# Patient Record
Sex: MALE | Race: BLACK OR AFRICAN AMERICAN | Employment: FULL TIME | URBAN - METROPOLITAN AREA
[De-identification: names, ages, dates, MRNs, and addresses within clinical notes are randomized per-mention and may not be internally consistent; named-entity substitution may affect disease eponyms.]

---

## 2021-07-23 ENCOUNTER — VIRTUAL VISIT (OUTPATIENT)
Dept: FAMILY MEDICINE CLINIC | Age: 46
End: 2021-07-23
Payer: COMMERCIAL

## 2021-07-23 DIAGNOSIS — F32.A DEPRESSION, UNSPECIFIED DEPRESSION TYPE: ICD-10-CM

## 2021-07-23 DIAGNOSIS — F41.9 ANXIETY: Primary | ICD-10-CM

## 2021-07-23 DIAGNOSIS — R63.5 WEIGHT GAIN: ICD-10-CM

## 2021-07-23 DIAGNOSIS — R53.83 FATIGUE, UNSPECIFIED TYPE: ICD-10-CM

## 2021-07-23 PROCEDURE — 99203 OFFICE O/P NEW LOW 30 MIN: CPT | Performed by: FAMILY MEDICINE

## 2021-07-23 RX ORDER — ESCITALOPRAM OXALATE 10 MG/1
TABLET ORAL
COMMUNITY
Start: 2021-07-06

## 2021-07-23 RX ORDER — LISINOPRIL AND HYDROCHLOROTHIAZIDE 12.5; 2 MG/1; MG/1
TABLET ORAL
COMMUNITY
Start: 2021-07-09 | End: 2022-02-23 | Stop reason: SDUPTHER

## 2021-07-23 RX ORDER — AMLODIPINE BESYLATE 5 MG/1
TABLET ORAL
COMMUNITY
Start: 2021-07-15 | End: 2022-02-23 | Stop reason: SDUPTHER

## 2021-07-23 RX ORDER — QUETIAPINE FUMARATE 200 MG/1
TABLET, FILM COATED ORAL
COMMUNITY
Start: 2021-07-16

## 2021-07-23 NOTE — PATIENT INSTRUCTIONS
[facility] Board:  24 hour crisis line: [phone]  Daytime number: [phone]  Psychiatry, counseling, case management, drug/alcohol addiction    Abrahan Mckeon [address] (Dr. Sunil Kate): [employer]. com/  [address] [facility]. [address], [address]  Phone: [phone] ([phone])  Fax: [phone]  Office Hours:  Mon: 10:00 am to 4:00 pm  Tue: 8:00 am to 6:00 pm  Wed-Thur: 8:00 am to 7:00 pm    [facility]: [employer]. com/  Joseph ([phone]),  Jun ([phone])  Ruth ([phone])  [address] ([phone])    [address] [facility]  Addiction/Substance abuse   [place]: [phone]  [place]: [facility]: [employer].[address] , [address] [place] [address] [address]  Ph. [phone] Fax [phone]    Zee Kasper: [email or web address]  [employer] ([phone])  Shahram Warren ([phone])

## 2021-07-23 NOTE — PROGRESS NOTES
Eloy Candelario  39 y.o. male  1975  [address] [facility] [address]  [id number]   Queenie Celeste MD       Encounter Date and Time: July 23, 2021 at 2:31 PM    Consent: Eloy Candelario, who was seen by synchronous (real-time) audio-video technology, and/or his healthcare decision maker, is aware that this patient-initiated, Telehealth encounter on 7/23/2021 is a billable service, with coverage as determined by his insurance carrier. He is aware that he may receive a bill and has provided verbal consent to proceed: Yes. Chief Complaint   Patient presents with    Depression    Anxiety     History of Present Illness   Eloy Candelario is a 39 y.o. male was evaluated by synchronous (real-time) audio-video technology from home, through a secure patient portal.    Patient with a history of depression and anxiety for 20 years and has been on medications for 10 years. Patient interested in finding a psychiatrist.  No SI or HI. Also complaining of fatigue, loss of sex drive and weight gain. Review of Systems   ROS    Vitals/Objective:     General: alert, cooperative, no distress   Mental  status: mental status: alert, oriented to person, place, and time, normal mood, behavior, speech, dress, motor activity, and thought processes   Resp: resp: normal effort and no respiratory distress   Neuro: neuro: no gross deficits   Skin: skin: no discoloration or lesions of concern on visible areas   Due to this being a TeleHealth evaluation, many elements of the physical examination are unable to be assessed. Assessment and Plan:       1. Anxiety  - REFERRAL TO PSYCHIATRY    2. Depression, unspecified depression type  - REFERRAL TO PSYCHIATRY    3. Weight gain  - TSH 3RD GENERATION; Future  - METABOLIC PANEL, COMPREHENSIVE; Future    4. Fatigue, unspecified type  - TSH 3RD GENERATION; Future  - CBC WITH AUTOMATED DIFF; Future  - METABOLIC PANEL, COMPREHENSIVE;  Future    Patient interested in [employer] [facility].  States he has some labs ordered by 98.6 telemedicine and will bring those labs in when he comes in. Will order labs for concerns above, will need in person visit for annual wellness. Time spent in direct conversation with the patient to include medical condition(s) discussed, assessment and treatment plan:       We discussed the expected course, resolution and complications of the diagnosis(es) in detail. Medication risks, benefits, costs, interactions, and alternatives were discussed as indicated. I advised him to contact the office if his condition worsens, changes or fails to improve as anticipated. He expressed understanding with the diagnosis(es) and plan. Patient understands that this encounter was a temporary measure, and the importance of further follow up and examination was emphasized. Patient verbalized understanding. Patient informed to follow up: Follow-up and Dispositions  ·   Return in about 2 weeks (around 8/6/2021) for In person for annual wellness visit . Electronically Signed: Jin Samayoa MD    CPT Codes 50216-26890 for Established Patients may apply to this Telehealth Visit. POS code: 18. Modifier GT    Sergey Ariza is a 39 y.o. male who was evaluated by an audio-video encounter for concerns as above. Patient identification was verified prior to start of the visit. A caregiver was present when appropriate. Due to this being a TeleHealth encounter (During RZJ-84 public health emergency), evaluation of the following organ systems was limited: Vitals/Constitutional/EENT/Resp/CV/GI//MS/Neuro/Skin/Heme-Lymph-Imm.   Pursuant to the emergency declaration under the [facility], 1135 waiver authority and the [employer] and [employer] Act, this Virtual Visit was conducted, with patient's (and/or legal guardian's) consent, to reduce the patient's risk of exposure to COVID-19 and provide necessary medical care. Services were provided through a synchronous discussion virtually to substitute for in-person clinic visit. I was at home. The patient was at home. History   Patients past medical, surgical and family histories were reviewed and updated. No past medical history on file. No past surgical history on file. No family history on file. Social History     Tobacco Use    Smoking status: Not on file   Substance Use Topics    Alcohol use: Not on file    Drug use: Not on file     There is no problem list on file for this patient.          Current Medications/Allergies   Medications and Allergies reviewed:      No Known Allergies

## 2021-08-17 ENCOUNTER — OFFICE VISIT (OUTPATIENT)
Dept: FAMILY MEDICINE CLINIC | Age: 46
End: 2021-08-17
Payer: COMMERCIAL

## 2021-08-17 VITALS
WEIGHT: 315 LBS | BODY MASS INDEX: 45.1 KG/M2 | DIASTOLIC BLOOD PRESSURE: 83 MMHG | HEIGHT: 70 IN | HEART RATE: 87 BPM | OXYGEN SATURATION: 94 % | RESPIRATION RATE: 16 BRPM | TEMPERATURE: 98.5 F | SYSTOLIC BLOOD PRESSURE: 130 MMHG

## 2021-08-17 DIAGNOSIS — F32.A DEPRESSION, UNSPECIFIED DEPRESSION TYPE: ICD-10-CM

## 2021-08-17 DIAGNOSIS — R07.89 CHEST DISCOMFORT: ICD-10-CM

## 2021-08-17 DIAGNOSIS — F41.9 ANXIETY: ICD-10-CM

## 2021-08-17 DIAGNOSIS — Z00.00 WELLNESS EXAMINATION: Primary | ICD-10-CM

## 2021-08-17 DIAGNOSIS — I10 ESSENTIAL HYPERTENSION: ICD-10-CM

## 2021-08-17 DIAGNOSIS — G47.33 OBSTRUCTIVE SLEEP APNEA SYNDROME: ICD-10-CM

## 2021-08-17 PROBLEM — G47.30 SLEEP APNEA: Status: ACTIVE | Noted: 2021-08-17

## 2021-08-17 PROCEDURE — 99213 OFFICE O/P EST LOW 20 MIN: CPT | Performed by: STUDENT IN AN ORGANIZED HEALTH CARE EDUCATION/TRAINING PROGRAM

## 2021-08-17 PROCEDURE — 93000 ELECTROCARDIOGRAM COMPLETE: CPT | Performed by: STUDENT IN AN ORGANIZED HEALTH CARE EDUCATION/TRAINING PROGRAM

## 2021-08-17 NOTE — PROGRESS NOTES
[address] [facility] [address] [facility]   Office [phone], Fax [phone]    Subjective:     Chief Complaint   Patient presents with    Complete Physical     Patient is coming in for a complete physical and bloodwork. he states thatlast week he almost had a panick attack and has been feeling off. He felt sick and heart raising. Sometimes he felt like it was burps that was having trouble to come up. No other concerns. HPI:  Al Adjutant is a 42 y.o. male  with PMHx of HTN, anxiety, sleep apnea (on CPAP), insomnia, and \"leaky heart valve\" that presents for a wellness examination. HTN: Well controlled. Patient takes his medications daily as prescribed. Anxiety/Depression: On lexapro and seroquel for 10 years now. Patient states this has helped a lot. However, he reports a break through panic attack last week after a bad day at work where he thought he was going to lose his job. He reports that his heart started racing and he couldn't stop the racing thoughts. He eventually calmed down but has felt chest heaviness since the event. He says it has started to get better over time. Patient is scheduled to see a new psychiatrist in the coming weeks. He denies SI/HI. Sleep apnea on CPAP and insomnia: Patient uses CPAP nightly. Reports he still has difficulty sleeping despite seroquel use. Reports he has been taking his fiance's trazadone during the interim when he couldn't get his seroquel refilled. He wants to come off the seroquel as he feels it makes him too groggy. \"Leaky valve\": Patient reports that he was diagnosed with many years ago, doesn't recall what exactly the diagnosis was. Does recall that if he goes to dentist he has to go on antibiotics prior. Has had stress test many years ago (doesn't remember why) and recalls it was unremarkable. Diet/Exercise: Trying to increase vegetables, have a hard time cutting back on carbs. Wants to start exercising by walking daily. Fiance is encouraging of these lifestyle changes. Social hx: Works at a group home. Drinks 1 drink every 2 weeks, no smoking, no illicit drug use. Health Maintenance:  Health Maintenance Due   Topic Date Due    Hepatitis C Screening  Never done    COVID-19 Vaccine (1) Never done    DTaP/Tdap/Td series (1 - Tdap) Never done    Lipid Screen  Never done    Patient is COVID vaccinated and will bring card in at next appointment. Past Medical Hx  I personally reviewed (see HPI above)      SocHx   I personally reviewed (see HPI above)       Allergies  I personally reviewed. Allergies   Allergen Reactions    Seafood Other (comments)     vomitting    Other Plant, Animal, Environmental Hives     Any color dye cause him a rash and hives        Medications  I personally reviewed. Current Outpatient Medications on File Prior to Visit   Medication Sig Dispense Refill    amLODIPine (NORVASC) 5 mg tablet TAKE 1 TABLET BY MOUTH EVERY DAY      escitalopram oxalate (LEXAPRO) 10 mg tablet TAKE 1 TABLET BY MOUTH EVERY DAY AS DIRECTED FOR 90 DAYS      lisinopril-hydroCHLOROthiazide (PRINZIDE, ZESTORETIC) 20-12.5 mg per tablet TAKE 1 TABLET BY MOUTH EVERY DAY      QUEtiapine (SEROquel) 200 mg tablet        No current facility-administered medications on file prior to visit. ROS:     Review of Systems   Constitutional:        Weight gain   Cardiovascular:        Chest tightening, heavy feeling on chest   Gastrointestinal: Negative for abdominal pain and heartburn. Psychiatric/Behavioral: Positive for depression. Negative for substance abuse and suicidal ideas. The patient is nervous/anxious. Objective:   Vitals  I personally reviewed.   Visit Vitals  /83 (BP 1 Location: Right upper arm, BP Patient Position: Sitting)   Pulse 87   Temp 98.5 °F (36.9 °C) (Oral)   Resp 16   Ht 5' 10.28\" (1.785 m)   Wt (!) 362 lb (164.2 kg)   SpO2 94%   BMI 51.53 kg/m²        Physical Exam  Physical Exam   General AO x 3. No distress. Not diaphoretic. No jaundice. No cyanosis. No pallor. HENT Head Normocephalic and atraumatic. Eyes No scleral icterus. EOMI. Neck No thyromegaly present. No cervical adenopathy. Cardio Normal rate, regular rhythm. No murmur, gallop, or friction rub. No chest wall tenderness. Pulmonary Effort normal. Breath sounds normal. No respiratory distress. No wheezes, rales, or rhonchi. No Stridor. Abdominal Soft. Bowel sounds normal. No distension. No tenderness. Extremities No edema of lower extremities. No tenderness. Neurological No focal deficits. Skin Skin is warm and dry. No rash noted. No erythema. Psychiatric Mood, affect, and judgment normal.        I personally reviewed the following Pertinent Labs/Studies:   - None    Assessment:       ICD-10-CM ICD-9-CM    1. Wellness examination  Z00.00 V70.0 LIPID PANEL   2. Chest discomfort  R07.89 786.59 AMB POC EKG ROUTINE W/ 12 LEADS, INTER & REP   3. Anxiety  F41.9 300.00    4. Depression, unspecified depression type  F32.9 311    5. Essential hypertension  I10 401.9    6. Obstructive sleep apnea syndrome  G47.33 327.23        Plan:     Wellness examination  Patient will come back in for lab appointment. Will call with results. - LIPID PANEL; Future  - TSH  - CMP  - CBC    Chest discomfort   EKG consistent with NSR, RRR, no axis deviation, no ST segment changes. Likely secondary to panic attack with lingering anxiety. Anxiety & Depression, unspecified depression type  Patient has appointment with psychiatrist pending. Waiting to make changes to medications until sees psychiatrist.     Essential hypertension  Continue medications as previously prescribed. Stable at this time. Obstructive sleep apnea syndrome  Continue CPAP at night. Encouraged weight loss to help improve sleep apnea. Obesity  Spent significant time discussing importance of balanced diet and exercise.  Patient is trying to cut back on fast foods, increase vegetable intake and watch carbs. I encouraged him to focus on small changes daily. Encouraged daily walks for as long as he can to start for exercise. - Follow up on progress at next appt          Pt was discussed with Dr. Nasir Tadeo (attending physician). I have reviewed patient medical and social history and medications. I have reviewed pertinent labs results and other data. I have discussed the diagnosis with the patient and the intended plan as seen in the above orders. The patient has received an after-visit summary and questions were answered concerning future plans. I have discussed medication side effects and warnings with the patient as well.     Luci Judd MD  [facility]  08/17/21

## 2021-08-17 NOTE — PROGRESS NOTES
Nora Alvarado is a 42 y.o. male    Chief Complaint   Patient presents with    Complete Physical     Patient is coming in for a complete physical and bloodwork. he states thatlast week he almost had a panick attack and has been feeling off. He felt sick and heart raising. Sometimes he felt like it was burps that was having trouble to come up. No other concerns. 1. Have you been to the ER, urgent care clinic since your last visit? Hospitalized since your last visit? No    2. Have you seen or consulted any other health care providers outside of the [address] [place] [address] since your last visit? Include any pap smears or colon screening. No      Visit Vitals  /83 (BP 1 Location: Right upper arm, BP Patient Position: Sitting)   Pulse 87   Temp 98.5 °F (36.9 °C) (Oral)   Resp 16   Ht 5' 10.28\" (1.785 m)   Wt (!) 362 lb (164.2 kg)   SpO2 94%   BMI 51.53 kg/m²           Health Maintenance Due   Topic Date Due    Hepatitis C Screening  Never done    COVID-19 Vaccine (1) Never done    DTaP/Tdap/Td series (1 - Tdap) Never done    Lipid Screen  Never done         Medication Reconciliation completed, changes noted.   Please  Update medication list.

## 2021-08-17 NOTE — PATIENT INSTRUCTIONS
Thank you for coming in! As discussed, please follow up with psychiatrist appointment you made. We will follow up on the labs and let you know if we need to make any changes.

## 2021-08-25 ENCOUNTER — LAB ONLY (OUTPATIENT)
Dept: FAMILY MEDICINE CLINIC | Age: 46
End: 2021-08-25

## 2021-08-25 DIAGNOSIS — R53.83 FATIGUE, UNSPECIFIED TYPE: ICD-10-CM

## 2021-08-25 DIAGNOSIS — Z00.00 WELLNESS EXAMINATION: ICD-10-CM

## 2021-08-25 DIAGNOSIS — R63.5 WEIGHT GAIN: ICD-10-CM

## 2021-08-25 LAB
ALBUMIN SERPL-MCNC: 3.9 G/DL (ref 3.5–5)
ALBUMIN/GLOB SERPL: 1 {RATIO} (ref 1.1–2.2)
ALP SERPL-CCNC: 81 U/L (ref 45–117)
ALT SERPL-CCNC: 28 U/L (ref 12–78)
ANION GAP SERPL CALC-SCNC: 4 MMOL/L (ref 5–15)
AST SERPL-CCNC: 21 U/L (ref 15–37)
BILIRUB SERPL-MCNC: 0.6 MG/DL (ref 0.2–1)
BUN SERPL-MCNC: 17 MG/DL (ref 6–20)
BUN/CREAT SERPL: 13 (ref 12–20)
CALCIUM SERPL-MCNC: 8.5 MG/DL (ref 8.5–10.1)
CHLORIDE SERPL-SCNC: 107 MMOL/L (ref 97–108)
CHOLEST SERPL-MCNC: 172 MG/DL
CO2 SERPL-SCNC: 29 MMOL/L (ref 21–32)
CREAT SERPL-MCNC: 1.34 MG/DL (ref 0.7–1.3)
GLOBULIN SER CALC-MCNC: 3.8 G/DL (ref 2–4)
GLUCOSE SERPL-MCNC: 91 MG/DL (ref 65–100)
HDLC SERPL-MCNC: 46 MG/DL
HDLC SERPL: 3.7 {RATIO} (ref 0–5)
LDLC SERPL CALC-MCNC: 103.6 MG/DL (ref 0–100)
POTASSIUM SERPL-SCNC: 4.4 MMOL/L (ref 3.5–5.1)
PROT SERPL-MCNC: 7.7 G/DL (ref 6.4–8.2)
SODIUM SERPL-SCNC: 140 MMOL/L (ref 136–145)
TRIGL SERPL-MCNC: 112 MG/DL (ref ?–150)
TSH SERPL DL<=0.05 MIU/L-ACNC: 0.99 UIU/ML (ref 0.36–3.74)
VLDLC SERPL CALC-MCNC: 22.4 MG/DL

## 2021-08-27 ENCOUNTER — TELEPHONE (OUTPATIENT)
Dept: FAMILY MEDICINE CLINIC | Age: 46
End: 2021-08-27

## 2021-08-27 DIAGNOSIS — R79.89 ELEVATED SERUM CREATININE: Primary | ICD-10-CM

## 2021-08-30 NOTE — PROGRESS NOTES
I don't think we usually consider statin before risk is over 7.5%. See me next time we're both in clinic.   [facility]

## 2021-09-10 ENCOUNTER — TELEPHONE (OUTPATIENT)
Dept: FAMILY MEDICINE CLINIC | Age: 46
End: 2021-09-10

## 2021-09-10 NOTE — TELEPHONE ENCOUNTER
Called patient and verified identity x2. Informed him to watch fat intake. Cr slightly elevated at 1.34. Told him likely due to dehydration, we can repeat in 6 months when he follows up for HTN.

## 2021-11-11 ENCOUNTER — VIRTUAL VISIT (OUTPATIENT)
Dept: FAMILY MEDICINE CLINIC | Age: 46
End: 2021-11-11
Payer: COMMERCIAL

## 2021-11-11 DIAGNOSIS — R63.4 WEIGHT LOSS: ICD-10-CM

## 2021-11-11 DIAGNOSIS — R53.83 FATIGUE, UNSPECIFIED TYPE: ICD-10-CM

## 2021-11-11 DIAGNOSIS — K21.9 GASTROESOPHAGEAL REFLUX DISEASE, UNSPECIFIED WHETHER ESOPHAGITIS PRESENT: ICD-10-CM

## 2021-11-11 DIAGNOSIS — R11.0 NAUSEA: ICD-10-CM

## 2021-11-11 DIAGNOSIS — R11.10 VOMITING, INTRACTABILITY OF VOMITING NOT SPECIFIED, PRESENCE OF NAUSEA NOT SPECIFIED, UNSPECIFIED VOMITING TYPE: Primary | ICD-10-CM

## 2021-11-11 PROCEDURE — 99214 OFFICE O/P EST MOD 30 MIN: CPT | Performed by: FAMILY MEDICINE

## 2021-11-11 RX ORDER — PHENOL/SODIUM PHENOLATE
20 AEROSOL, SPRAY (ML) MUCOUS MEMBRANE DAILY
Qty: 42 TABLET | Refills: 0 | Status: SHIPPED | OUTPATIENT
Start: 2021-11-11 | End: 2021-12-23

## 2021-11-11 NOTE — PROGRESS NOTES
Cristo Wilburn  42 y.o. male  1975  [address] [place] [address]  [id number]   Citlali Alvarado MD       Encounter Date and Time: November 11, 2021 at 2:59 PM    Consent: Cristo Wilburn, who was seen by synchronous (real-time) audio-video technology, and/or his healthcare decision maker, is aware that this patient-initiated, Telehealth encounter on 11/11/2021 is a billable service, with coverage as determined by his insurance carrier. He is aware that he may receive a bill and has provided verbal consent to proceed: Yes. Chief Complaint   Patient presents with    Vomiting    Fatigue     History of Present Illness   Cristo Wilburn is a 42 y.o. male was evaluated by synchronous (real-time) audio-video technology from home, through a secure patient portal.    For the past 3 weeks, has had decreased appetite, nausea and vomitting many times after meals but not every meal, usually a few times a week. Feels like food is sitting in throat for a while. Also feeling more fatigued. Also lost some weight since then. Better with water. No abdominal pain and having normal bowel movements. Denies heartburn and denies cough. Not worse with any certain foods. Denies recent illness but has some sinus headaches which is his baseline for this time of year. States he went to [facility] for this and did some blood work. Not sure what what was tested other than workers comp testing for HIV and Hepatitis. States he is very unhappy at his job and works in a [employer] at [employer]. Review of Systems   Review of Systems   Constitutional: Negative for fever. Respiratory: Negative for cough, shortness of breath and wheezing. Cardiovascular: Negative for chest pain, palpitations, orthopnea and leg swelling.      Vitals/Objective:     General: alert, cooperative, no distress   Mental  status: mental status: alert, oriented to person, place, and time, normal mood, behavior, speech, dress, motor activity, and thought processes   Resp: resp: normal effort and no respiratory distress   Neuro: neuro: no gross deficits   Skin: skin: no discoloration or lesions of concern on visible areas   Due to this being a TeleHealth evaluation, many elements of the physical examination are unable to be assessed. Assessment and Plan:       1. Vomiting, intractability of vomiting not specified, presence of nausea not specified, unspecified vomiting type  - METABOLIC PANEL, COMPREHENSIVE; Future  - CBC WITH AUTOMATED DIFF; Future  - TSH 3RD GENERATION; Future    2. Nausea  - METABOLIC PANEL, COMPREHENSIVE; Future  - CBC WITH AUTOMATED DIFF; Future  - TSH 3RD GENERATION; Future    3. Fatigue, unspecified type  - METABOLIC PANEL, COMPREHENSIVE; Future  - CBC WITH AUTOMATED DIFF; Future  - TSH 3RD GENERATION; Future    4. Weight loss  - METABOLIC PANEL, COMPREHENSIVE; Future  - CBC WITH AUTOMATED DIFF; Future  - TSH 3RD GENERATION; Future    5. Gastroesophageal reflux disease, unspecified whether esophagitis present  - Omeprazole delayed release (PRILOSEC D/R) 20 mg tablet; Take 1 Tablet by mouth daily for 42 days. Dispense: 42 Tablet; Refill: 0    Will come in and get labs. Can try PPI for 6 weeks. If not better or cannot tolerate being off PPI, may need endoscopy. Number to [facility] given. We discussed the expected course, resolution and complications of the diagnosis(es) in detail. Medication risks, benefits, costs, interactions, and alternatives were discussed as indicated. I advised him to contact the office if his condition worsens, changes or fails to improve as anticipated. He expressed understanding with the diagnosis(es) and plan. Patient understands that this encounter was a temporary measure, and the importance of further follow up and examination was emphasized. Patient verbalized understanding. Patient informed to follow up:    Follow-up and Dispositions  ·   Return in about 2 months (around 1/11/2022) for Follow up for acute issue and 1 year for annual wellness . Electronically Signed: Sandra Manzo MD    CPT Codes 09364-72912 for Established Patients may apply to this Telehealth Visit. POS code: 18. Modifier GT    Alireza Escudero is a 42 y.o. male who was evaluated by an audio-video encounter for concerns as above. Patient identification was verified prior to start of the visit. A caregiver was present when appropriate. Due to this being a TeleHealth encounter (During [facility]- public health emergency), evaluation of the following organ systems was limited: Vitals/Constitutional/EENT/Resp/CV/GI//MS/Neuro/Skin/Heme-Lymph-Imm. Pursuant to the emergency declaration under the [address] [place] [address], 1135 waiver authority and the [employer] Act, this Virtual Visit was conducted, with patient's (and/or legal guardian's) consent, to reduce the patient's risk of exposure to COVID-19 and provide necessary medical care. Services were provided through a synchronous discussion virtually to substitute for in-person clinic visit. I was at home. The patient was at home. History   Patients past medical, surgical and family histories were reviewed and updated. No past medical history on file. No past surgical history on file. No family history on file. Social History     Tobacco Use    Smoking status: Not on file    Smokeless tobacco: Not on file   Substance Use Topics    Alcohol use: Not on file    Drug use: Not on file     Patient Active Problem List   Diagnosis Code    Hypertension I10    Anxiety F41.9    Depression F32. A    Sleep apnea G47.30          Current Medications/Allergies   Medications and Allergies reviewed:    Current Outpatient Medications   Medication Sig Dispense Refill    Omeprazole delayed release (PRILOSEC D/R) 20 mg tablet Take 1 Tablet by mouth daily for 42 days.  43 Tablet 0    amLODIPine (NORVASC) 5 mg tablet TAKE 1 TABLET BY MOUTH EVERY DAY      escitalopram oxalate (LEXAPRO) 10 mg tablet TAKE 1 TABLET BY MOUTH EVERY DAY AS DIRECTED FOR 90 DAYS      lisinopril-hydroCHLOROthiazide (PRINZIDE, ZESTORETIC) 20-12.5 mg per tablet TAKE 1 TABLET BY MOUTH EVERY DAY      QUEtiapine (SEROquel) 200 mg tablet        Allergies   Allergen Reactions    Seafood Other (comments)     vomitting    Other Plant, Animal, Environmental Hives     Any color dye cause him a rash and hives

## 2021-12-13 ENCOUNTER — LAB ONLY (OUTPATIENT)
Dept: FAMILY MEDICINE CLINIC | Age: 46
End: 2021-12-13

## 2021-12-13 DIAGNOSIS — R11.10 VOMITING, INTRACTABILITY OF VOMITING NOT SPECIFIED, PRESENCE OF NAUSEA NOT SPECIFIED, UNSPECIFIED VOMITING TYPE: ICD-10-CM

## 2021-12-13 DIAGNOSIS — R53.83 FATIGUE, UNSPECIFIED TYPE: ICD-10-CM

## 2021-12-13 DIAGNOSIS — R11.0 NAUSEA: ICD-10-CM

## 2021-12-13 DIAGNOSIS — R63.4 WEIGHT LOSS: ICD-10-CM

## 2021-12-14 LAB
ALBUMIN SERPL-MCNC: 4.1 G/DL (ref 3.5–5)
ALBUMIN/GLOB SERPL: 1 {RATIO} (ref 1.1–2.2)
ALP SERPL-CCNC: 96 U/L (ref 45–117)
ALT SERPL-CCNC: 24 U/L (ref 12–78)
ANION GAP SERPL CALC-SCNC: 2 MMOL/L (ref 5–15)
AST SERPL-CCNC: 19 U/L (ref 15–37)
BASOPHILS # BLD: 0 K/UL (ref 0–0.1)
BASOPHILS NFR BLD: 0 % (ref 0–1)
BILIRUB SERPL-MCNC: 0.6 MG/DL (ref 0.2–1)
BUN SERPL-MCNC: 16 MG/DL (ref 6–20)
BUN/CREAT SERPL: 13 (ref 12–20)
CALCIUM SERPL-MCNC: 9.3 MG/DL (ref 8.5–10.1)
CHLORIDE SERPL-SCNC: 103 MMOL/L (ref 97–108)
CO2 SERPL-SCNC: 30 MMOL/L (ref 21–32)
CREAT SERPL-MCNC: 1.26 MG/DL (ref 0.7–1.3)
DIFFERENTIAL METHOD BLD: NORMAL
EOSINOPHIL # BLD: 0.2 K/UL (ref 0–0.4)
EOSINOPHIL NFR BLD: 4 % (ref 0–7)
ERYTHROCYTE [DISTWIDTH] IN BLOOD BY AUTOMATED COUNT: 13.9 % (ref 11.5–14.5)
GLOBULIN SER CALC-MCNC: 4.1 G/DL (ref 2–4)
GLUCOSE SERPL-MCNC: 103 MG/DL (ref 65–100)
HCT VFR BLD AUTO: 43.2 % (ref 36.6–50.3)
HGB BLD-MCNC: 14.2 G/DL (ref 12.1–17)
IMM GRANULOCYTES # BLD AUTO: 0 K/UL (ref 0–0.04)
IMM GRANULOCYTES NFR BLD AUTO: 0 % (ref 0–0.5)
LYMPHOCYTES # BLD: 1.9 K/UL (ref 0.8–3.5)
LYMPHOCYTES NFR BLD: 40 % (ref 12–49)
MCH RBC QN AUTO: 27.6 PG (ref 26–34)
MCHC RBC AUTO-ENTMCNC: 32.9 G/DL (ref 30–36.5)
MCV RBC AUTO: 83.9 FL (ref 80–99)
MONOCYTES # BLD: 0.5 K/UL (ref 0–1)
MONOCYTES NFR BLD: 10 % (ref 5–13)
NEUTS SEG # BLD: 2.2 K/UL (ref 1.8–8)
NEUTS SEG NFR BLD: 46 % (ref 32–75)
NRBC # BLD: 0 K/UL (ref 0–0.01)
NRBC BLD-RTO: 0 PER 100 WBC
PLATELET # BLD AUTO: 302 K/UL (ref 150–400)
PMV BLD AUTO: 10.9 FL (ref 8.9–12.9)
POTASSIUM SERPL-SCNC: 4.1 MMOL/L (ref 3.5–5.1)
PROT SERPL-MCNC: 8.2 G/DL (ref 6.4–8.2)
RBC # BLD AUTO: 5.15 M/UL (ref 4.1–5.7)
SODIUM SERPL-SCNC: 135 MMOL/L (ref 136–145)
TSH SERPL DL<=0.05 MIU/L-ACNC: 1.12 UIU/ML (ref 0.36–3.74)
WBC # BLD AUTO: 4.8 K/UL (ref 4.1–11.1)

## 2021-12-17 NOTE — PROGRESS NOTES
Your labs look within good range. Your kidney function has improved. Your sodium is slightly low, but this is something you can repeat at your follow up visit. If still with symptoms or concerns, please schedule an in person appt for further evaluation sooner than the recommended 1-2 months.

## 2021-12-28 ENCOUNTER — NURSE TRIAGE (OUTPATIENT)
Dept: OTHER | Facility: CLINIC | Age: 46
End: 2021-12-28

## 2021-12-28 NOTE — TELEPHONE ENCOUNTER
Received call from Guerita Holley at [facility] with [employer]. Subjective: Caller states \"Fever, headache, body aches, chills, sinus and chest congestion, states he want to [facility] yesterday, had a COVID test that was negative, still feels like crap. Pt states he was given a z-pack at the [facility] and he is taking the meds. \"    Pt has been vaccinated for COVID, no flu shot. Pt denies any CP, SOB, or difficulty breathing. Current Symptoms: Nausea, headache, sinus and chest congestion, moderate productive cough (thick, grey mild amounts)    Onset: 3 days ago; gradual    Associated Symptoms: reduced appetite, stated fever earlier today 101.0, orally, noted right ear pain    Pain Severity: 6/10; aching and throbbing; constant; worse with coughing    Temperature: 98.1  orally    What has been tried: Tylenol, OTC cold and flu meds, z-pack    LMP: NA Pregnant: NA    Recommended disposition: To be seen in the office today    Care advice provided, patient verbalizes understanding; denies any other questions or concerns; instructed to call back for any new or worsening symptoms. Writer provided warm transfer to Baudilio at [facility] for appointment scheduling    Attention Provider: Thank you for allowing me to participate in the care of your patient. The patient was connected to triage in response to information provided to the [facility]. Please do not respond through this encounter as the response is not directed to a shared pool.       Reason for Disposition   Earache    Protocols used: COMMON COLD-ADULT-[place]

## 2021-12-29 ENCOUNTER — TELEPHONE (OUTPATIENT)
Dept: FAMILY MEDICINE CLINIC | Age: 46
End: 2021-12-29

## 2021-12-29 NOTE — TELEPHONE ENCOUNTER
Called Pt to schedule VV appt per Dr Lydia Robles. Due to covid like symptoms.  Left a VM, no answer from PT.    Yair Rausch

## 2021-12-30 ENCOUNTER — TELEPHONE (OUTPATIENT)
Dept: FAMILY MEDICINE CLINIC | Age: 46
End: 2021-12-30

## 2021-12-30 NOTE — TELEPHONE ENCOUNTER
----- Message from Briseida Barillas sent at 12/30/2021  4:35 PM EST -----  Subject: Message to Provider    QUESTIONS  Information for Provider? Confirming a VV Monday at 2:00 he was trying   call to talk to wayne. Please contact pt Monday  ---------------------------------------------------------------------------  --------------  CALL BACK INFO  What is the best way for the office to contact you? OK to leave message on   voicemail  Preferred Call Back Phone Number? [phone]  ---------------------------------------------------------------------------  --------------  SCRIPT ANSWERS  Relationship to Patient?  Self

## 2022-02-23 RX ORDER — LISINOPRIL AND HYDROCHLOROTHIAZIDE 12.5; 2 MG/1; MG/1
1 TABLET ORAL DAILY
Qty: 30 TABLET | Refills: 2 | Status: SHIPPED | OUTPATIENT
Start: 2022-02-23 | End: 2022-06-20

## 2022-02-23 RX ORDER — AMLODIPINE BESYLATE 5 MG/1
5 TABLET ORAL DAILY
Qty: 30 TABLET | Refills: 2 | Status: SHIPPED | OUTPATIENT
Start: 2022-02-23 | End: 2022-06-20

## 2022-02-23 NOTE — TELEPHONE ENCOUNTER
----- Message from Timmy Marley sent at 2/21/2022  5:07 PM EST -----  Subject: Refill Request    QUESTIONS  Name of Medication? amLODIPine (NORVASC) 5 mg tablet  Patient-reported dosage and instructions? take one tablet daily  How many days do you have left? 0  Preferred Pharmacy? [facility]  Pharmacy phone number (if available)? [phone]  ---------------------------------------------------------------------------  --------------,  Name of Medication? lisinopril-hydroCHLOROthiazide (PRINZIDE, ZESTORETIC)   20-12.5 mg per tablet  Patient-reported dosage and instructions? take one tablet daily  How many days do you have left? 0  Preferred Pharmacy? [facility]  Pharmacy phone number (if available)? [phone]  ---------------------------------------------------------------------------  --------------  [facility] INFO  What is the best way for the office to contact you? OK to leave message on   voicemail  Preferred Call Back Phone Number?  [phone]

## 2022-03-18 PROBLEM — F41.9 ANXIETY: Status: ACTIVE | Noted: 2021-08-17

## 2022-03-19 PROBLEM — I10 HYPERTENSION: Status: ACTIVE | Noted: 2021-08-17

## 2022-03-19 PROBLEM — G47.30 SLEEP APNEA: Status: ACTIVE | Noted: 2021-08-17

## 2022-03-20 PROBLEM — F32.A DEPRESSION: Status: ACTIVE | Noted: 2021-08-17
